# Patient Record
Sex: FEMALE | URBAN - METROPOLITAN AREA
[De-identification: names, ages, dates, MRNs, and addresses within clinical notes are randomized per-mention and may not be internally consistent; named-entity substitution may affect disease eponyms.]

---

## 2017-09-06 ENCOUNTER — APPOINTMENT (OUTPATIENT)
Age: 32
Setting detail: DERMATOLOGY
End: 2017-09-07

## 2017-09-06 DIAGNOSIS — L98.8 OTHER SPECIFIED DISORDERS OF THE SKIN AND SUBCUTANEOUS TISSUE: ICD-10-CM

## 2017-09-06 PROBLEM — L90.8 OTHER ATROPHIC DISORDERS OF SKIN: Status: ACTIVE | Noted: 2017-09-06

## 2017-09-06 PROCEDURE — OTHER DYSPORT: OTHER

## 2017-09-20 ENCOUNTER — APPOINTMENT (OUTPATIENT)
Age: 32
Setting detail: DERMATOLOGY
End: 2017-09-20

## 2017-09-20 DIAGNOSIS — L98.8 OTHER SPECIFIED DISORDERS OF THE SKIN AND SUBCUTANEOUS TISSUE: ICD-10-CM

## 2017-09-20 PROBLEM — L90.8 OTHER ATROPHIC DISORDERS OF SKIN: Status: ACTIVE | Noted: 2017-09-20

## 2017-09-20 PROCEDURE — OTHER DYSPORT: OTHER

## 2017-11-20 ENCOUNTER — APPOINTMENT (OUTPATIENT)
Age: 32
Setting detail: DERMATOLOGY
End: 2017-11-20

## 2017-11-20 DIAGNOSIS — L98.8 OTHER SPECIFIED DISORDERS OF THE SKIN AND SUBCUTANEOUS TISSUE: ICD-10-CM

## 2017-11-20 PROBLEM — L90.8 OTHER ATROPHIC DISORDERS OF SKIN: Status: ACTIVE | Noted: 2017-11-20

## 2017-11-20 PROCEDURE — OTHER DYSPORT: OTHER

## 2019-03-29 ENCOUNTER — APPOINTMENT (OUTPATIENT)
Age: 34
Setting detail: DERMATOLOGY
End: 2019-03-29

## 2019-03-29 DIAGNOSIS — L98.8 OTHER SPECIFIED DISORDERS OF THE SKIN AND SUBCUTANEOUS TISSUE: ICD-10-CM

## 2019-03-29 PROCEDURE — OTHER DYSPORT: OTHER

## 2019-04-12 ENCOUNTER — APPOINTMENT (OUTPATIENT)
Age: 34
Setting detail: DERMATOLOGY
End: 2019-04-12

## 2019-04-12 DIAGNOSIS — L98.8 OTHER SPECIFIED DISORDERS OF THE SKIN AND SUBCUTANEOUS TISSUE: ICD-10-CM

## 2019-04-12 PROCEDURE — OTHER DYSPORT: OTHER

## 2019-06-19 ENCOUNTER — APPOINTMENT (OUTPATIENT)
Age: 34
Setting detail: DERMATOLOGY
End: 2019-06-19

## 2019-06-19 DIAGNOSIS — L98.8 OTHER SPECIFIED DISORDERS OF THE SKIN AND SUBCUTANEOUS TISSUE: ICD-10-CM

## 2019-06-19 PROCEDURE — OTHER DYSPORT: OTHER

## 2019-09-19 ENCOUNTER — APPOINTMENT (OUTPATIENT)
Age: 34
Setting detail: DERMATOLOGY
End: 2019-09-19

## 2019-09-19 DIAGNOSIS — L98.8 OTHER SPECIFIED DISORDERS OF THE SKIN AND SUBCUTANEOUS TISSUE: ICD-10-CM

## 2019-09-19 PROCEDURE — OTHER DYSPORT: OTHER

## 2020-07-29 ENCOUNTER — APPOINTMENT (OUTPATIENT)
Age: 35
Setting detail: DERMATOLOGY
End: 2020-08-16

## 2020-07-29 VITALS — TEMPERATURE: 99.6 F

## 2020-07-29 DIAGNOSIS — L98.8 OTHER SPECIFIED DISORDERS OF THE SKIN AND SUBCUTANEOUS TISSUE: ICD-10-CM

## 2020-07-29 PROCEDURE — OTHER DYSPORT: OTHER

## 2020-08-19 ENCOUNTER — APPOINTMENT (OUTPATIENT)
Age: 35
Setting detail: DERMATOLOGY
End: 2020-08-24

## 2020-08-19 VITALS — TEMPERATURE: 98 F

## 2020-08-19 DIAGNOSIS — L98.8 OTHER SPECIFIED DISORDERS OF THE SKIN AND SUBCUTANEOUS TISSUE: ICD-10-CM

## 2020-08-19 PROCEDURE — OTHER ADDITIONAL NOTES: OTHER

## 2020-08-19 NOTE — PROCEDURE: ADDITIONAL NOTES
Additional Notes: [employer] 35 units exp 2022. Lot number [id number]. Additional Notes: [employer] 35 units exp 2022. Lot number [id number].

## 2020-11-02 ENCOUNTER — APPOINTMENT (OUTPATIENT)
Age: 35
Setting detail: DERMATOLOGY
End: 2020-12-14

## 2020-11-02 VITALS — TEMPERATURE: 98.5 F

## 2020-11-02 DIAGNOSIS — L98.8 OTHER SPECIFIED DISORDERS OF THE SKIN AND SUBCUTANEOUS TISSUE: ICD-10-CM

## 2020-11-02 PROCEDURE — OTHER ADDITIONAL NOTES: OTHER

## 2021-03-24 ENCOUNTER — APPOINTMENT (OUTPATIENT)
Age: 36
Setting detail: DERMATOLOGY
End: 2021-08-23

## 2021-03-24 VITALS — TEMPERATURE: 98.2 F

## 2021-03-24 DIAGNOSIS — L98.8 OTHER SPECIFIED DISORDERS OF THE SKIN AND SUBCUTANEOUS TISSUE: ICD-10-CM

## 2021-03-24 PROCEDURE — OTHER ADDITIONAL NOTES: OTHER

## 2021-03-24 NOTE — PROCEDURE: ADDITIONAL NOTES
Additional Notes: [employer] 25 units. Lot number [id number]. Exp 11/2023 Additional Notes: [employer] 25 units. Lot number [id number]. Exp 11/2023

## 2021-04-07 ENCOUNTER — APPOINTMENT (OUTPATIENT)
Age: 36
Setting detail: DERMATOLOGY
End: 2021-08-23

## 2021-04-07 VITALS — TEMPERATURE: 98.2 F

## 2021-04-07 DIAGNOSIS — L98.8 OTHER SPECIFIED DISORDERS OF THE SKIN AND SUBCUTANEOUS TISSUE: ICD-10-CM

## 2021-04-07 PROCEDURE — OTHER ADDITIONAL NOTES: OTHER

## 2021-04-07 NOTE — PROCEDURE: ADDITIONAL NOTES
Additional Notes: [employer] [address] [place] [address] number [id number]. Exp 11/2023 Additional Notes: [employer] [address] [place] [address] number [id number]. Exp 11/2023

## 2022-02-15 ENCOUNTER — APPOINTMENT (OUTPATIENT)
Dept: URBAN - METROPOLITAN AREA CLINIC 265 | Age: 37
Setting detail: DERMATOLOGY
End: 2022-07-11

## 2022-02-15 DIAGNOSIS — L98.8 OTHER SPECIFIED DISORDERS OF THE SKIN AND SUBCUTANEOUS TISSUE: ICD-10-CM

## 2022-02-15 PROCEDURE — OTHER BOTOX: OTHER

## 2022-03-04 ENCOUNTER — APPOINTMENT (OUTPATIENT)
Dept: URBAN - METROPOLITAN AREA CLINIC 265 | Age: 37
Setting detail: DERMATOLOGY
End: 2022-07-11

## 2022-03-04 DIAGNOSIS — L98.8 OTHER SPECIFIED DISORDERS OF THE SKIN AND SUBCUTANEOUS TISSUE: ICD-10-CM

## 2022-03-04 PROCEDURE — OTHER ADDITIONAL NOTES: OTHER

## 2022-03-04 NOTE — PROCEDURE: ADDITIONAL NOTES
Additional Notes: [employer] Lot #  [id number] EXP: 8/2024 16 units Additional Notes: [employer] Lot #  [id number] EXP: 8/2024 16 units

## 2022-04-20 ENCOUNTER — APPOINTMENT (OUTPATIENT)
Dept: URBAN - METROPOLITAN AREA CLINIC 265 | Age: 37
Setting detail: DERMATOLOGY
End: 2022-07-11

## 2022-04-20 DIAGNOSIS — L98.8 OTHER SPECIFIED DISORDERS OF THE SKIN AND SUBCUTANEOUS TISSUE: ICD-10-CM

## 2022-04-20 PROCEDURE — OTHER ADDITIONAL NOTES: OTHER

## 2022-04-20 NOTE — PROCEDURE: ADDITIONAL NOTES
Additional Notes: [employer]  44 units lot number [id number] exp 08/2024 Additional Notes: [employer]  44 units lot number [id number] exp 08/2024

## 2022-08-05 ENCOUNTER — APPOINTMENT (OUTPATIENT)
Dept: URBAN - METROPOLITAN AREA CLINIC 265 | Age: 37
Setting detail: DERMATOLOGY
End: 2022-08-22

## 2022-08-05 DIAGNOSIS — L98.8 OTHER SPECIFIED DISORDERS OF THE SKIN AND SUBCUTANEOUS TISSUE: ICD-10-CM

## 2022-08-05 PROCEDURE — OTHER ADDITIONAL NOTES: OTHER

## 2022-08-05 NOTE — PROCEDURE: ADDITIONAL NOTES
Additional Notes: [employer] 46 units. Exp 10/2024 lot [id number] Additional Notes: [employer] 46 units. Exp 10/2024 lot [id number]

## 2022-11-11 ENCOUNTER — APPOINTMENT (OUTPATIENT)
Dept: URBAN - METROPOLITAN AREA CLINIC 265 | Age: 37
Setting detail: DERMATOLOGY
End: 2022-11-18

## 2022-11-11 DIAGNOSIS — L98.8 OTHER SPECIFIED DISORDERS OF THE SKIN AND SUBCUTANEOUS TISSUE: ICD-10-CM

## 2022-11-11 PROCEDURE — OTHER ADDITIONAL NOTES: OTHER

## 2022-11-11 NOTE — PROCEDURE: ADDITIONAL NOTES
Detail Level: Simple
Render Risk Assessment In Note?: no
Additional Notes: [employer] 44 units lot number lot [id number] exp 02/2025

## 2023-02-10 ENCOUNTER — APPOINTMENT (OUTPATIENT)
Dept: URBAN - METROPOLITAN AREA CLINIC 265 | Age: 38
Setting detail: DERMATOLOGY
End: 2023-02-20

## 2023-02-10 DIAGNOSIS — L98.8 OTHER SPECIFIED DISORDERS OF THE SKIN AND SUBCUTANEOUS TISSUE: ICD-10-CM

## 2023-02-10 PROCEDURE — OTHER ADDITIONAL NOTES: OTHER

## 2023-02-10 NOTE — PROCEDURE: ADDITIONAL NOTES
Additional Notes: [employer] 44 units lot [id number] exp 04/2025
Detail Level: Simple
Render Risk Assessment In Note?: no
